# Patient Record
Sex: MALE | Race: OTHER | Employment: FULL TIME | URBAN - METROPOLITAN AREA
[De-identification: names, ages, dates, MRNs, and addresses within clinical notes are randomized per-mention and may not be internally consistent; named-entity substitution may affect disease eponyms.]

---

## 2018-07-16 ENCOUNTER — HOSPITAL ENCOUNTER (EMERGENCY)
Age: 28
Discharge: HOME | End: 2018-07-16
Attending: EMERGENCY MEDICINE
Payer: SELF-PAY

## 2018-07-16 ENCOUNTER — APPOINTMENT (OUTPATIENT)
Dept: GENERAL RADIOLOGY | Age: 28
End: 2018-07-16
Attending: EMERGENCY MEDICINE
Payer: SELF-PAY

## 2018-07-16 VITALS
HEART RATE: 84 BPM | BODY MASS INDEX: 34.1 KG/M2 | RESPIRATION RATE: 16 BRPM | HEIGHT: 76 IN | WEIGHT: 280 LBS | SYSTOLIC BLOOD PRESSURE: 145 MMHG | DIASTOLIC BLOOD PRESSURE: 74 MMHG | OXYGEN SATURATION: 98 % | TEMPERATURE: 98 F

## 2018-07-16 DIAGNOSIS — S91.332A PUNCTURE WOUND OF LEFT FOOT, INITIAL ENCOUNTER: Primary | ICD-10-CM

## 2018-07-16 PROCEDURE — 90715 TDAP VACCINE 7 YRS/> IM: CPT | Performed by: PHYSICIAN ASSISTANT

## 2018-07-16 PROCEDURE — 96372 THER/PROPH/DIAG INJ SC/IM: CPT

## 2018-07-16 PROCEDURE — 74011000250 HC RX REV CODE- 250: Performed by: EMERGENCY MEDICINE

## 2018-07-16 PROCEDURE — 74011250637 HC RX REV CODE- 250/637: Performed by: EMERGENCY MEDICINE

## 2018-07-16 PROCEDURE — 73630 X-RAY EXAM OF FOOT: CPT

## 2018-07-16 PROCEDURE — 99283 EMERGENCY DEPT VISIT LOW MDM: CPT

## 2018-07-16 PROCEDURE — 74011250636 HC RX REV CODE- 250/636: Performed by: PHYSICIAN ASSISTANT

## 2018-07-16 PROCEDURE — 90471 IMMUNIZATION ADMIN: CPT

## 2018-07-16 RX ORDER — CEPHALEXIN 500 MG/1
500 CAPSULE ORAL
Status: COMPLETED | OUTPATIENT
Start: 2018-07-16 | End: 2018-07-16

## 2018-07-16 RX ORDER — CEPHALEXIN 500 MG/1
500 CAPSULE ORAL 4 TIMES DAILY
Qty: 28 CAP | Refills: 0 | Status: SHIPPED | OUTPATIENT
Start: 2018-07-16 | End: 2018-07-23

## 2018-07-16 RX ORDER — BACITRACIN 500 UNIT/G
1 PACKET (EA) TOPICAL ONCE
Status: COMPLETED | OUTPATIENT
Start: 2018-07-16 | End: 2018-07-16

## 2018-07-16 RX ADMIN — CEPHALEXIN 500 MG: 500 CAPSULE ORAL at 21:32

## 2018-07-16 RX ADMIN — BACITRACIN 1 PACKET: 500 OINTMENT TOPICAL at 21:32

## 2018-07-16 RX ADMIN — TETANUS TOXOID, REDUCED DIPHTHERIA TOXOID AND ACELLULAR PERTUSSIS VACCINE, ADSORBED 0.5 ML: 5; 2.5; 8; 8; 2.5 SUSPENSION INTRAMUSCULAR at 21:06

## 2018-07-16 NOTE — ED TRIAGE NOTES
Triage note: Pt arrives ambulatory with c/o puncture wound to right heel. [place] and [place] rusted metal pole. Tetanus not up to date.

## 2018-07-17 NOTE — ED PROVIDER NOTES
HPI Comments: 25 y.o. male with no significant past medical history who presents via private vehicle with for wound check. Per friend, pt [place] when he stepped on a metal pole sticking out of a rock with the heel of his left foot. Pt reports mild pain to the area. Pt does not know date of his last tetanus shot. There are no other acute medical concerns at this time. Social hx: Argentine speaker, does not speak english    Note written by Sneha Carrasco, as dictated by Lenore Huerta MD 9:11 PM      The history is provided by the patient. A  was used (friend). History reviewed. No pertinent past medical history. History reviewed. No pertinent surgical history. No family history on file. Social History     Social History    Marital status: SINGLE     Spouse name: N/A    Number of children: N/A    Years of education: N/A     Occupational History    Not on file. Social History Main Topics    Smoking status: Not on file    Smokeless tobacco: Not on file    Alcohol use Not on file    Drug use: Not on file    Sexual activity: Not on file     Other Topics Concern    Not on file     Social History Narrative    No narrative on file         ALLERGIES: Review of patient's allergies indicates no known allergies. Review of Systems   Constitutional: Negative for activity change and fever. Eyes: Negative for pain. Respiratory: Negative for cough and shortness of breath. Cardiovascular: Negative for chest pain and leg swelling. Gastrointestinal: Negative for abdominal pain. Genitourinary: Negative for flank pain and hematuria. Musculoskeletal: Negative for gait problem, neck pain and neck stiffness. Skin: Positive for wound. Negative for color change. Neurological: Negative for speech difficulty and headaches. Hematological: Does not bruise/bleed easily. Psychiatric/Behavioral: Negative for confusion.    All other systems reviewed and are negative. Vitals:    07/16/18 1954   BP: 159/86   Pulse: 99   Resp: 16   Temp: 98.3 °F (36.8 °C)   SpO2: 96%   Weight: 127 kg (280 lb)   Height: 6' 4\" (1.93 m)            Physical Exam   Constitutional: He is oriented to person, place, and time. He appears well-developed and well-nourished. No distress. HENT:   Head: Normocephalic and atraumatic. Right Ear: External ear normal.   Left Ear: External ear normal.   Eyes: EOM are normal. Pupils are equal, round, and reactive to light. Neck: Normal range of motion. Neck supple. No JVD present. No tracheal deviation present. Pulmonary/Chest: No stridor. No respiratory distress. Abdominal: Soft. Bowel sounds are normal. He exhibits no distension. There is no tenderness. There is no rebound and no guarding. Musculoskeletal: Normal range of motion. He exhibits no edema or tenderness. Neurological: He is alert and oriented to person, place, and time. He has normal reflexes. No cranial nerve deficit. Coordination normal.   Skin: Skin is warm and dry. No rash noted. He is not diaphoretic. No erythema. Left heel: .5 cm x .5 cm puncture wound, no foreign body, no redness, no tenderness, no warmth   Psychiatric: He has a normal mood and affect. His behavior is normal. Judgment and thought content normal.   Nursing note and vitals reviewed. Note written by Sneha Jama, as dictated by Katherine Luis MD 9:11 PM    MDM  Number of Diagnoses or Management Options  Diagnosis management comments: 25-year-old  male presents to the emergency department with a puncture wound. Patient has a puncture wound to his heel. Will update his tetanus. Will apply dressing with bacitracin. We'll give him Keflex. We'll check x-rays to evaluate for foreign body or fracture. Patient agrees with this plan. Will discharge with antibiotics.        Amount and/or Complexity of Data Reviewed  Tests in the radiology section of CPT®: ordered and reviewed  Decide to obtain previous medical records or to obtain history from someone other than the patient: yes  Obtain history from someone other than the patient: yes  Review and summarize past medical records: yes  Independent visualization of images, tracings, or specimens: yes          ED Course       Procedures  Xray left foot: No fracture, very small punctate metallic foreign bodies. No procedure needed. Dressing on foot placed    Home with Keflex    Good return precautions given to patient. Close follow up with PCP recommended. Patient and/or family voices understanding of this plan. Discharge instructions were explained by me and all concerns were addressed.

## 2018-07-17 NOTE — DISCHARGE INSTRUCTIONS
Heridas por punción: Instrucciones de cuidado - [ Puncture Wounds: Care Instructions ]  Instrucciones de cuidado    Kavon herida por punción puede ocurrir en cualquier parte del cuerpo. Estas heridas tienden a ser [employer] y [employer] beth. Las heridas por punción se suelen dejar abiertas en lugar de cerrarlas. [place] se debe a que kavon herida por punción puede infectarse con facilidad, y cerrarla puede aumentar aún más la probabilidad de infección. Es probable que tenga kavon venda sobre la herida. El médico lo huerta examinado minuciosamente, zachariah pueden presentarse problemas más tarde. Si nota algún problema o nuevos síntomas, busque tratamiento médico de inmediato. La atención de seguimiento es kavon parte clave de dill tratamiento y seguridad. Asegúrese de hacer y acudir a todas las citas, y llame a dill médico si está teniendo problemas. También es kavon buena idea saber los resultados de galdino exámenes y mantener kavon lista de los medicamentos que edenilson. ¿Cómo puede cuidarse en el hogar? · Mantenga la herida seca xiang las primeras 24 a 48 horas. Después de [address] [employer], puede ducharse si el médico lo [facility]. Seque la herida con toques aslhee de toalla. · No remoje la herida, oralia en [employer] Dill médico le indicará cuándo es seguro mojar la herida. · Si dill médico le dijo cómo cuidarse la herida, siga las instrucciones de dill médico. Si no le shima instrucciones, siga estos consejos generales:  ¨ Después de las primeras 24 a 48 horas, lávese la herida con agua limpia 2 veces al día. No use peróxido de hidrógeno (agua [place]) ni alcohol, los cuales pueden retrasar la sanación. ¨ Puede cubrirse la herida con kavon capa delgada de vaselina y kavon venda no adherente. ¨ Aplíquese más vaselina y [facility] la venda según sea necesario. · Mantenga elevada la boyn adolorida sobre almohadas siempre que esté sentado o acostado xiang los 3 días siguientes. Trate de mantenerla por encima del nivel del corazón. [place] ayudará a reducir la hinchazón. · Evite cualquier actividad que pueda hacer que la herida empeore. · Sea john con los medicamentos. Sita y siga todas las indicaciones de la [place]. ¨ Si el médico le recetó un analgésico (medicamento para el dolor), tómelo según las indicaciones. ¨ Si no está tomando un analgésico recetado, pregúntele a dill médico si puede durga rancho de [employer]. · Si dill médico le recetó antibióticos, tómelos según las indicaciones. No deje de tomarlos por el hecho de sentirse mejor. Debe durga todos los antibióticos hasta terminarlos. ¿Cuándo debes pedir ayuda? Llama a tu médico ahora mismo o busca atención médica inmediata si:    · Tienes dolor nuevo, o el dolor empeora.     · La herida comienza a sangrar y la duncan empapa la venda. Es normal que salgan pequeñas cantidades de [place].     · La piel cerca de la herida está fría o pálida, o cambia de color.     · Sientes hormigueo, debilitamiento o entumecimiento cerca de la herida.     · Tienes dificultad para  la bony cerca de la herida.     · Tienes síntomas de infección, tales oralia:  ¨ Aumento del dolor, la hinchazón, el enrojecimiento o la temperatura alrededor de la herida. ¨ Vetas rojizas que salen de la herida. ¨ Pus que sale de la herida. Trevon Banter Gennett Joie especial atención a los cambios en tu jose y asegúrate de comunicarte con tu médico si:    · La herida no se adrianna (no se vuelve más pequeña).   · No mejoras oralia se esperaba. ¿Dónde puede encontrar más información en inglés? Keaton Moore a http://cara-amee.info/. Mandi Knox [id number] en la búsqueda para aprender más acerca de \"Heridas por punción: Instrucciones de cuidado - [ Puncture Wounds: Care Instructions ]. \"  Revisado: 20 noviembre, 2017  Versión del contenido: 11.7  © 9126-2457 [employer].  Las instrucciones de cuidado fueron adaptadas bajo licencia por [employer] (which disclaims liability or warranty for this information). Si usted tiene [place] [place] afección médica o sobre estas instrucciones, siempre pregunte a dill profesional de jose. [employer] niega toda garantía o responsabilidad por dill uso de esta información.